# Patient Record
Sex: FEMALE | Race: BLACK OR AFRICAN AMERICAN | Employment: OTHER | URBAN - METROPOLITAN AREA
[De-identification: names, ages, dates, MRNs, and addresses within clinical notes are randomized per-mention and may not be internally consistent; named-entity substitution may affect disease eponyms.]

---

## 2017-04-11 LAB
ALBUMIN SERPL-MCNC: 3.9 G/DL
ALBUMIN SERPL-MCNC: 3.9 G/DL
ALP BLD-CCNC: 114 U/L
ALP BLD-CCNC: 114 U/L
ALT SERPL-CCNC: 15 U/L
ALT SERPL-CCNC: 15 U/L
AST SERPL-CCNC: 24 U/L
AST SERPL-CCNC: 24 U/L
BILIRUB SERPL-MCNC: 1 MG/DL (ref 0.1–1.4)
BILIRUB SERPL-MCNC: 1 MG/DL (ref 0.1–1.4)
BUN BLDV-MCNC: 16 MG/DL
BUN BLDV-MCNC: 16 MG/DL
CALCIUM SERPL-MCNC: 9.3 MG/DL
CALCIUM SERPL-MCNC: 9.3 MG/DL
CHLORIDE BLD-SCNC: 102 MMOL/L
CHLORIDE BLD-SCNC: 102 MMOL/L
CHOLESTEROL, TOTAL: 121 MG/DL
CHOLESTEROL, TOTAL: 121 MG/DL
CHOLESTEROL/HDL RATIO: NORMAL
CHOLESTEROL/HDL RATIO: NORMAL
CO2: 26 MMOL/L
CO2: 26 MMOL/L
CREAT SERPL-MCNC: 1 MG/DL
CREAT SERPL-MCNC: 1 MG/DL
GFR CALCULATED: 50
GFR CALCULATED: NORMAL
GLUCOSE BLD-MCNC: 128 MG/DL
GLUCOSE BLD-MCNC: 128 MG/DL
HBA1C MFR BLD: 6.7 %
HCT VFR BLD CALC: 38.9 % (ref 36–46)
HDLC SERPL-MCNC: 57 MG/DL (ref 35–70)
HDLC SERPL-MCNC: 57 MG/DL (ref 35–70)
HEMOGLOBIN: 12.4 G/DL (ref 12–16)
LDL CHOLESTEROL CALCULATED: 42 MG/DL (ref 0–160)
LDL CHOLESTEROL CALCULATED: 42 MG/DL (ref 0–160)
PLATELET # BLD: 193 K/ΜL
POTASSIUM SERPL-SCNC: 4.2 MMOL/L
POTASSIUM SERPL-SCNC: 4.2 MMOL/L
SODIUM BLD-SCNC: 142 MMOL/L
SODIUM BLD-SCNC: 142 MMOL/L
TOTAL PROTEIN: 7.3
TOTAL PROTEIN: 7.3
TRIGL SERPL-MCNC: 112 MG/DL
TRIGL SERPL-MCNC: 112 MG/DL
VLDLC SERPL CALC-MCNC: 22 MG/DL
VLDLC SERPL CALC-MCNC: NORMAL MG/DL
WBC # BLD: 6.2 10^3/ML

## 2017-04-17 ENCOUNTER — OFFICE VISIT (OUTPATIENT)
Dept: CARDIOLOGY CLINIC | Age: 82
End: 2017-04-17

## 2017-04-17 VITALS
RESPIRATION RATE: 16 BRPM | WEIGHT: 153 LBS | SYSTOLIC BLOOD PRESSURE: 130 MMHG | OXYGEN SATURATION: 95 % | BODY MASS INDEX: 30.04 KG/M2 | HEIGHT: 60 IN | HEART RATE: 64 BPM | DIASTOLIC BLOOD PRESSURE: 84 MMHG

## 2017-04-17 DIAGNOSIS — E11.9 TYPE 2 DIABETES MELLITUS WITHOUT COMPLICATION, WITHOUT LONG-TERM CURRENT USE OF INSULIN (HCC): ICD-10-CM

## 2017-04-17 DIAGNOSIS — I25.10 CORONARY ARTERY DISEASE INVOLVING NATIVE CORONARY ARTERY OF NATIVE HEART WITHOUT ANGINA PECTORIS: Primary | ICD-10-CM

## 2017-04-17 DIAGNOSIS — I10 ESSENTIAL HYPERTENSION: ICD-10-CM

## 2017-04-17 DIAGNOSIS — E78.5 HYPERLIPIDEMIA, UNSPECIFIED HYPERLIPIDEMIA TYPE: ICD-10-CM

## 2017-04-17 PROCEDURE — 99214 OFFICE O/P EST MOD 30 MIN: CPT | Performed by: INTERNAL MEDICINE

## 2017-11-06 ENCOUNTER — OFFICE VISIT (OUTPATIENT)
Dept: CARDIOLOGY CLINIC | Age: 82
End: 2017-11-06

## 2017-11-06 VITALS
BODY MASS INDEX: 26.8 KG/M2 | DIASTOLIC BLOOD PRESSURE: 78 MMHG | HEIGHT: 64 IN | WEIGHT: 157 LBS | SYSTOLIC BLOOD PRESSURE: 118 MMHG | HEART RATE: 84 BPM

## 2017-11-06 DIAGNOSIS — I25.10 CORONARY ARTERY DISEASE INVOLVING NATIVE CORONARY ARTERY OF NATIVE HEART WITHOUT ANGINA PECTORIS: Primary | ICD-10-CM

## 2017-11-06 DIAGNOSIS — I10 ESSENTIAL HYPERTENSION: ICD-10-CM

## 2017-11-06 DIAGNOSIS — E78.49 OTHER HYPERLIPIDEMIA: ICD-10-CM

## 2017-11-06 DIAGNOSIS — E11.9 TYPE 2 DIABETES MELLITUS WITHOUT COMPLICATION, WITHOUT LONG-TERM CURRENT USE OF INSULIN (HCC): ICD-10-CM

## 2017-11-06 PROCEDURE — 99214 OFFICE O/P EST MOD 30 MIN: CPT | Performed by: INTERNAL MEDICINE

## 2017-11-06 NOTE — PROGRESS NOTES
by mouth daily.  omeprazole (PRILOSEC) 20 MG capsule Take 20 mg by mouth daily.  aspirin 81 MG EC tablet Take 81 mg by mouth daily.  metoprolol (TOPROL XL) 25 MG XL tablet Take 25 mg by mouth daily.  amLODIPine (NORVASC) 5 MG tablet Take 5 mg by mouth daily. ALLERGIES    No Known Allergies    FAMILY HISTORY    Family History   Problem Relation Age of Onset    Other Mother 68     pneumonia    Cancer Father     Cancer Brother     Cancer Brother 79     2008    Heart Disease Brother     Heart Attack Brother        SURGICAL HISTORY    Past Surgical History:   Procedure Laterality Date    BREAST LUMPECTOMY  2/08    left    HYSTERECTOMY  1965    KNEE SURGERY  07/2013    left knee       PHYSICAL EXAM    Vital Signs:  /78   Pulse 84   Ht 5' 4\" (1.626 m)   Wt 157 lb (71.2 kg)   BMI 26.95 kg/m²   Constitutional:  Well developed, well nourished, no acute distress, non-toxic appearance. HENT:  Normocephalic, atraumatic, bilateral external ears normal, oropharynx moist, no oral exudates, Nose normal. Neck- normal range of motion, no tenderness, supple, no stridor. Eyes:  PERRL, EOMI, conjunctiva normal, no discharge. Respiratory:  Lungs are clear to auscultation  Cardiovascular:  Regular rhythm grade 2/6 systolic murmur left sternal border no double or gallop  GI:  Bowel sounds normal, Soft, no tenderness, no masses, no pulsatile masses. Integument:  Warm, dry, no erythema, no rash. Back:  No tenderness, no CVA tenderness. Musculoskeletal:  Intact distal pulses, no edema, no tenderness, no cyanosis, no clubbing. Good range of motion in all major joints. No tenderness to palpation or major deformities noted. Back- No tenderness. Neurologic:  Alert & oriented x 3, normal motor function, normal sensory function, no focal deficits noted. Psychiatric:  Affect normal, judgment normal, mood normal.     AssessmenT    1.  Coronary artery disease involving native coronary artery of native heart without angina pectoris    2. Type 2 diabetes mellitus without complication, without long-term current use of insulin ([facility] [address]    3. Essential hypertension    4.  Other hyperlipidemia        Plan  Continue current management  Office visit in 6 months or as needed    Electronically signed by: Abram Long, 11/6/2017 12:30 PM

## 2018-05-16 ENCOUNTER — OFFICE VISIT (OUTPATIENT)
Dept: CARDIOLOGY CLINIC | Age: 83
End: 2018-05-16

## 2018-05-16 VITALS
HEART RATE: 80 BPM | DIASTOLIC BLOOD PRESSURE: 82 MMHG | SYSTOLIC BLOOD PRESSURE: 116 MMHG | HEIGHT: 64 IN | WEIGHT: 154.6 LBS | BODY MASS INDEX: 26.4 KG/M2

## 2018-05-16 DIAGNOSIS — I25.10 CORONARY ARTERY DISEASE INVOLVING NATIVE CORONARY ARTERY OF NATIVE HEART WITHOUT ANGINA PECTORIS: Primary | ICD-10-CM

## 2018-05-16 DIAGNOSIS — I10 ESSENTIAL HYPERTENSION: ICD-10-CM

## 2018-05-16 DIAGNOSIS — E78.5 HYPERLIPIDEMIA, UNSPECIFIED HYPERLIPIDEMIA TYPE: ICD-10-CM

## 2018-05-16 PROBLEM — E11.9 TYPE 2 DIABETES MELLITUS WITHOUT COMPLICATION (HCC): Status: RESOLVED | Noted: 2017-04-17 | Resolved: 2018-05-16

## 2018-05-16 PROCEDURE — 4040F PNEUMOC VAC/ADMIN/RCVD: CPT | Performed by: INTERNAL MEDICINE

## 2018-05-16 PROCEDURE — 1036F TOBACCO NON-USER: CPT | Performed by: INTERNAL MEDICINE

## 2018-05-16 PROCEDURE — 1090F PRES/ABSN URINE INCON ASSESS: CPT | Performed by: INTERNAL MEDICINE

## 2018-05-16 PROCEDURE — G8427 DOCREV CUR MEDS BY ELIG CLIN: HCPCS | Performed by: INTERNAL MEDICINE

## 2018-05-16 PROCEDURE — G8419 CALC BMI OUT NRM PARAM NOF/U: HCPCS | Performed by: INTERNAL MEDICINE

## 2018-05-16 PROCEDURE — 99213 OFFICE O/P EST LOW 20 MIN: CPT | Performed by: INTERNAL MEDICINE

## 2018-05-16 PROCEDURE — G8598 ASA/ANTIPLAT THER USED: HCPCS | Performed by: INTERNAL MEDICINE

## 2018-05-16 PROCEDURE — 1123F ACP DISCUSS/DSCN MKR DOCD: CPT | Performed by: INTERNAL MEDICINE

## 2020-01-28 ENCOUNTER — APPOINTMENT (OUTPATIENT)
Dept: CT IMAGING | Age: 85
End: 2020-01-28
Payer: MEDICARE

## 2020-01-28 ENCOUNTER — APPOINTMENT (OUTPATIENT)
Dept: GENERAL RADIOLOGY | Age: 85
End: 2020-01-28
Payer: MEDICARE

## 2020-01-28 ENCOUNTER — HOSPITAL ENCOUNTER (OUTPATIENT)
Age: 85
Setting detail: OBSERVATION
Discharge: NURSING, REHAB OR LONG-TERM CARE | End: 2020-01-31
Attending: EMERGENCY MEDICINE | Admitting: HOSPITALIST
Payer: MEDICARE

## 2020-01-28 LAB
ABO/RH: NORMAL
ACANTHOCYTES: ABNORMAL
ALBUMIN SERPL-MCNC: 4.3 GM/DL (ref 3.4–5)
ALP BLD-CCNC: 106 IU/L (ref 40–129)
ALT SERPL-CCNC: 71 U/L (ref 10–40)
ANION GAP SERPL CALCULATED.3IONS-SCNC: 15 MMOL/L (ref 4–16)
ANTIBODY SCREEN: NEGATIVE
APTT: 28.4 SECONDS (ref 25.1–37.1)
AST SERPL-CCNC: 102 IU/L (ref 15–37)
BANDED NEUTROPHILS ABSOLUTE COUNT: 0.86 K/CU MM
BANDED NEUTROPHILS RELATIVE PERCENT: 7 % (ref 5–11)
BILIRUB SERPL-MCNC: 1.3 MG/DL (ref 0–1)
BUN BLDV-MCNC: 21 MG/DL (ref 6–23)
CALCIUM SERPL-MCNC: 9.4 MG/DL (ref 8.3–10.6)
CHLORIDE BLD-SCNC: 99 MMOL/L (ref 99–110)
CO2: 27 MMOL/L (ref 21–32)
CREAT SERPL-MCNC: 1.2 MG/DL (ref 0.6–1.1)
DIFFERENTIAL TYPE: ABNORMAL
DOHLE BODIES: PRESENT
GFR AFRICAN AMERICAN: 51 ML/MIN/1.73M2
GFR NON-AFRICAN AMERICAN: 42 ML/MIN/1.73M2
GLUCOSE BLD-MCNC: 182 MG/DL (ref 70–99)
HCT VFR BLD CALC: 41.6 % (ref 37–47)
HEMOGLOBIN: 13.1 GM/DL (ref 12.5–16)
HYPOCHROMIA: ABNORMAL
INR BLD: 1.08 INDEX
LYMPHOCYTES ABSOLUTE: 0.4 K/CU MM
LYMPHOCYTES RELATIVE PERCENT: 3 % (ref 24–44)
MCH RBC QN AUTO: 29.6 PG (ref 27–31)
MCHC RBC AUTO-ENTMCNC: 31.5 % (ref 32–36)
MCV RBC AUTO: 93.9 FL (ref 78–100)
MONOCYTES ABSOLUTE: 1 K/CU MM
MONOCYTES RELATIVE PERCENT: 8 % (ref 0–4)
PDW BLD-RTO: 12.5 % (ref 11.7–14.9)
PLATELET # BLD: 126 K/CU MM (ref 140–440)
PMV BLD AUTO: 12.1 FL (ref 7.5–11.1)
POTASSIUM SERPL-SCNC: 3.5 MMOL/L (ref 3.5–5.1)
PRO-BNP: 513 PG/ML
PROTHROMBIN TIME: 13.1 SECONDS (ref 11.7–14.5)
RBC # BLD: 4.43 M/CU MM (ref 4.2–5.4)
SEGMENTED NEUTROPHILS ABSOLUTE COUNT: 10 K/CU MM
SEGMENTED NEUTROPHILS RELATIVE PERCENT: 82 % (ref 36–66)
SODIUM BLD-SCNC: 141 MMOL/L (ref 135–145)
TOTAL CK: 681 IU/L (ref 26–140)
TOTAL PROTEIN: 7.6 GM/DL (ref 6.4–8.2)
TOXIC GRANULATION: PRESENT
TROPONIN T: 0.01 NG/ML
WBC # BLD: 12.3 K/CU MM (ref 4–10.5)
WBC # BLD: ABNORMAL 10*3/UL

## 2020-01-28 PROCEDURE — 71045 X-RAY EXAM CHEST 1 VIEW: CPT

## 2020-01-28 PROCEDURE — 72192 CT PELVIS W/O DYE: CPT

## 2020-01-28 PROCEDURE — 86901 BLOOD TYPING SEROLOGIC RH(D): CPT

## 2020-01-28 PROCEDURE — 85027 COMPLETE CBC AUTOMATED: CPT

## 2020-01-28 PROCEDURE — 82550 ASSAY OF CK (CPK): CPT

## 2020-01-28 PROCEDURE — 73501 X-RAY EXAM HIP UNI 1 VIEW: CPT

## 2020-01-28 PROCEDURE — 85610 PROTHROMBIN TIME: CPT

## 2020-01-28 PROCEDURE — 85007 BL SMEAR W/DIFF WBC COUNT: CPT

## 2020-01-28 PROCEDURE — 86850 RBC ANTIBODY SCREEN: CPT

## 2020-01-28 PROCEDURE — 85730 THROMBOPLASTIN TIME PARTIAL: CPT

## 2020-01-28 PROCEDURE — 73552 X-RAY EXAM OF FEMUR 2/>: CPT

## 2020-01-28 PROCEDURE — 86900 BLOOD TYPING SEROLOGIC ABO: CPT

## 2020-01-28 PROCEDURE — 80053 COMPREHEN METABOLIC PANEL: CPT

## 2020-01-28 PROCEDURE — 84484 ASSAY OF TROPONIN QUANT: CPT

## 2020-01-28 PROCEDURE — 93005 ELECTROCARDIOGRAM TRACING: CPT | Performed by: PHYSICIAN ASSISTANT

## 2020-01-28 PROCEDURE — 72125 CT NECK SPINE W/O DYE: CPT

## 2020-01-28 PROCEDURE — 99285 EMERGENCY DEPT VISIT HI MDM: CPT

## 2020-01-28 PROCEDURE — 83880 ASSAY OF NATRIURETIC PEPTIDE: CPT

## 2020-01-28 PROCEDURE — 70450 CT HEAD/BRAIN W/O DYE: CPT

## 2020-01-28 ASSESSMENT — PAIN DESCRIPTION - LOCATION: LOCATION: LEG

## 2020-01-28 ASSESSMENT — PAIN SCALES - GENERAL: PAINLEVEL_OUTOF10: 7

## 2020-01-28 ASSESSMENT — PAIN DESCRIPTION - ORIENTATION: ORIENTATION: RIGHT;LEFT

## 2020-01-29 ENCOUNTER — APPOINTMENT (OUTPATIENT)
Dept: ULTRASOUND IMAGING | Age: 85
End: 2020-01-29
Payer: MEDICARE

## 2020-01-29 PROBLEM — E43 SEVERE MALNUTRITION (HCC): Chronic | Status: ACTIVE | Noted: 2020-01-29
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
## 2020-01-29 LAB
AMMONIA: 24 UMOL/L (ref 11–51)
BACTERIA: ABNORMAL /HPF
BILIRUBIN URINE: NEGATIVE MG/DL
BLOOD, URINE: ABNORMAL
CLARITY: ABNORMAL
COLOR: YELLOW
FOLATE: 14.7 NG/ML (ref 3.1–17.5)
GLUCOSE, URINE: 50 MG/DL
KETONES, URINE: NEGATIVE MG/DL
LEUKOCYTE ESTERASE, URINE: ABNORMAL
MUCUS: ABNORMAL HPF
NITRITE URINE, QUANTITATIVE: NEGATIVE
PH, URINE: 6 (ref 5–8)
PROTEIN UA: 100 MG/DL
RBC URINE: 253 /HPF (ref 0–6)
SPECIFIC GRAVITY UA: 1.02 (ref 1–1.03)
SQUAMOUS EPITHELIAL: 2 /HPF
TRICHOMONAS: ABNORMAL /HPF
TROPONIN T: 0.01 NG/ML
TSH HIGH SENSITIVITY: 1.92 UIU/ML (ref 0.27–4.2)
UROBILINOGEN, URINE: NORMAL MG/DL (ref 0.2–1)
VITAMIN B-12: 454.7 PG/ML (ref 211–911)
VITAMIN D 25-HYDROXY: 16.51 NG/ML
WBC CLUMP: ABNORMAL /HPF
WBC UA: 90 /HPF (ref 0–5)

## 2020-01-29 PROCEDURE — 96372 THER/PROPH/DIAG INJ SC/IM: CPT

## 2020-01-29 PROCEDURE — 82140 ASSAY OF AMMONIA: CPT

## 2020-01-29 PROCEDURE — 6360000002 HC RX W HCPCS: Performed by: INTERNAL MEDICINE

## 2020-01-29 PROCEDURE — 93010 ELECTROCARDIOGRAM REPORT: CPT | Performed by: INTERNAL MEDICINE

## 2020-01-29 PROCEDURE — 97166 OT EVAL MOD COMPLEX 45 MIN: CPT

## 2020-01-29 PROCEDURE — 6360000002 HC RX W HCPCS: Performed by: PHYSICIAN ASSISTANT

## 2020-01-29 PROCEDURE — 81001 URINALYSIS AUTO W/SCOPE: CPT

## 2020-01-29 PROCEDURE — 96365 THER/PROPH/DIAG IV INF INIT: CPT

## 2020-01-29 PROCEDURE — 97162 PT EVAL MOD COMPLEX 30 MIN: CPT

## 2020-01-29 PROCEDURE — 84443 ASSAY THYROID STIM HORMONE: CPT

## 2020-01-29 PROCEDURE — 6370000000 HC RX 637 (ALT 250 FOR IP): Performed by: HOSPITALIST

## 2020-01-29 PROCEDURE — 82607 VITAMIN B-12: CPT

## 2020-01-29 PROCEDURE — 76705 ECHO EXAM OF ABDOMEN: CPT

## 2020-01-29 PROCEDURE — 97535 SELF CARE MNGMENT TRAINING: CPT

## 2020-01-29 PROCEDURE — 2580000003 HC RX 258

## 2020-01-29 PROCEDURE — 84484 ASSAY OF TROPONIN QUANT: CPT

## 2020-01-29 PROCEDURE — 97530 THERAPEUTIC ACTIVITIES: CPT

## 2020-01-29 PROCEDURE — G0378 HOSPITAL OBSERVATION PER HR: HCPCS

## 2020-01-29 PROCEDURE — 6360000002 HC RX W HCPCS: Performed by: HOSPITALIST

## 2020-01-29 PROCEDURE — 51701 INSERT BLADDER CATHETER: CPT

## 2020-01-29 PROCEDURE — 2580000003 HC RX 258: Performed by: HOSPITALIST

## 2020-01-29 PROCEDURE — 87086 URINE CULTURE/COLONY COUNT: CPT

## 2020-01-29 PROCEDURE — 82306 VITAMIN D 25 HYDROXY: CPT

## 2020-01-29 PROCEDURE — 2580000003 HC RX 258: Performed by: INTERNAL MEDICINE

## 2020-01-29 PROCEDURE — 96376 TX/PRO/DX INJ SAME DRUG ADON: CPT

## 2020-01-29 PROCEDURE — 82746 ASSAY OF FOLIC ACID SERUM: CPT

## 2020-01-29 RX ORDER — DONEPEZIL HYDROCHLORIDE 5 MG/1
5 TABLET, FILM COATED ORAL NIGHTLY
Status: DISCONTINUED | OUTPATIENT
Start: 2020-01-29 | End: 2020-01-31 | Stop reason: HOSPADM

## 2020-01-29 RX ORDER — SODIUM CHLORIDE 9 MG/ML
INJECTION, SOLUTION INTRAVENOUS
Status: COMPLETED
Start: 2020-01-29 | End: 2020-01-29

## 2020-01-29 RX ORDER — DONEPEZIL HYDROCHLORIDE 5 MG/1
5 TABLET, FILM COATED ORAL NIGHTLY
COMMUNITY

## 2020-01-29 RX ORDER — SODIUM CHLORIDE 0.9 % (FLUSH) 0.9 %
10 SYRINGE (ML) INJECTION PRN
Status: DISCONTINUED | OUTPATIENT
Start: 2020-01-29 | End: 2020-01-31 | Stop reason: HOSPADM

## 2020-01-29 RX ORDER — AMLODIPINE BESYLATE 5 MG/1
5 TABLET ORAL DAILY
Status: DISCONTINUED | OUTPATIENT
Start: 2020-01-29 | End: 2020-01-31 | Stop reason: HOSPADM

## 2020-01-29 RX ORDER — SODIUM CHLORIDE 9 MG/ML
INJECTION, SOLUTION INTRAVENOUS CONTINUOUS
Status: DISCONTINUED | OUTPATIENT
Start: 2020-01-29 | End: 2020-01-31 | Stop reason: HOSPADM

## 2020-01-29 RX ORDER — ATORVASTATIN CALCIUM 20 MG/1
20 TABLET, FILM COATED ORAL DAILY
Status: DISCONTINUED | OUTPATIENT
Start: 2020-01-29 | End: 2020-01-31 | Stop reason: HOSPADM

## 2020-01-29 RX ORDER — ERGOCALCIFEROL 1.25 MG/1
50000 CAPSULE ORAL WEEKLY
Status: DISCONTINUED | OUTPATIENT
Start: 2020-01-29 | End: 2020-01-31 | Stop reason: HOSPADM

## 2020-01-29 RX ORDER — PANTOPRAZOLE SODIUM 40 MG/1
40 TABLET, DELAYED RELEASE ORAL
Status: DISCONTINUED | OUTPATIENT
Start: 2020-01-29 | End: 2020-01-31 | Stop reason: HOSPADM

## 2020-01-29 RX ORDER — METOPROLOL SUCCINATE 25 MG/1
25 TABLET, EXTENDED RELEASE ORAL DAILY
Status: DISCONTINUED | OUTPATIENT
Start: 2020-01-29 | End: 2020-01-31 | Stop reason: HOSPADM

## 2020-01-29 RX ORDER — ASPIRIN 81 MG/1
81 TABLET ORAL DAILY
Status: DISCONTINUED | OUTPATIENT
Start: 2020-01-29 | End: 2020-01-31 | Stop reason: HOSPADM

## 2020-01-29 RX ORDER — ACETAMINOPHEN 325 MG/1
650 TABLET ORAL EVERY 4 HOURS PRN
Status: DISCONTINUED | OUTPATIENT
Start: 2020-01-29 | End: 2020-01-31 | Stop reason: HOSPADM

## 2020-01-29 RX ORDER — MORPHINE SULFATE 4 MG/ML
2 INJECTION, SOLUTION INTRAMUSCULAR; INTRAVENOUS ONCE
Status: COMPLETED | OUTPATIENT
Start: 2020-01-29 | End: 2020-01-29

## 2020-01-29 RX ORDER — SODIUM CHLORIDE 0.9 % (FLUSH) 0.9 %
10 SYRINGE (ML) INJECTION EVERY 12 HOURS SCHEDULED
Status: DISCONTINUED | OUTPATIENT
Start: 2020-01-29 | End: 2020-01-31 | Stop reason: HOSPADM

## 2020-01-29 RX ORDER — ONDANSETRON 2 MG/ML
4 INJECTION INTRAMUSCULAR; INTRAVENOUS EVERY 6 HOURS PRN
Status: DISCONTINUED | OUTPATIENT
Start: 2020-01-29 | End: 2020-01-29

## 2020-01-29 RX ADMIN — PANTOPRAZOLE SODIUM 40 MG: 40 TABLET, DELAYED RELEASE ORAL at 05:28

## 2020-01-29 RX ADMIN — MORPHINE SULFATE 2 MG: 4 INJECTION, SOLUTION INTRAMUSCULAR; INTRAVENOUS at 02:38

## 2020-01-29 RX ADMIN — ENOXAPARIN SODIUM 30 MG: 30 INJECTION SUBCUTANEOUS at 08:35

## 2020-01-29 RX ADMIN — METOPROLOL SUCCINATE 25 MG: 25 TABLET, EXTENDED RELEASE ORAL at 08:35

## 2020-01-29 RX ADMIN — AMLODIPINE BESYLATE 5 MG: 5 TABLET ORAL at 08:34

## 2020-01-29 RX ADMIN — CEFTRIAXONE 1 G: 1 INJECTION, POWDER, FOR SOLUTION INTRAMUSCULAR; INTRAVENOUS at 15:31

## 2020-01-29 RX ADMIN — ASPIRIN 81 MG: 81 TABLET, COATED ORAL at 08:35

## 2020-01-29 RX ADMIN — ATORVASTATIN CALCIUM 20 MG: 20 TABLET, FILM COATED ORAL at 08:35

## 2020-01-29 RX ADMIN — SODIUM CHLORIDE: 9 INJECTION, SOLUTION INTRAVENOUS at 15:32

## 2020-01-29 RX ADMIN — SODIUM CHLORIDE: 9 INJECTION, SOLUTION INTRAVENOUS at 02:48

## 2020-01-29 RX ADMIN — DONEPEZIL HYDROCHLORIDE 5 MG: 5 TABLET, FILM COATED ORAL at 22:10

## 2020-01-29 RX ADMIN — SODIUM CHLORIDE: 9 INJECTION, SOLUTION INTRAVENOUS at 02:47

## 2020-01-29 ASSESSMENT — PAIN DESCRIPTION - PAIN TYPE: TYPE: ACUTE PAIN

## 2020-01-29 ASSESSMENT — PAIN DESCRIPTION - LOCATION: LOCATION: GENERALIZED;LEG

## 2020-01-29 ASSESSMENT — PAIN SCALES - GENERAL
PAINLEVEL_OUTOF10: 8
PAINLEVEL_OUTOF10: 8

## 2020-01-29 NOTE — CONSULTS
[address], 8/23/1928, [id number], 1/29/2020    History  [place]:  The primary encounter diagnosis was Syncope and collapse. Diagnoses of Fall down stairs, initial encounter, Right leg pain, Elevated liver enzymes, Elevated CK, and Elevated troponin were also pertinent to this visit. Patient  has a past medical history of CAD (coronary artery disease), Chronic cough, Depression, Family history of coronary artery disease, Fibromyalgia, Glaucoma, H/O cardiovascular stress test, H/O exercise stress test, H/O transfusion of whole blood, History of Doppler ultrasound, Hx of echocardiogram, Hyperlipidemia, Hypertension, Insomnia, Menopause, MI (myocardial infarction) ([facility] [address] Mitral valve regurgitation, and Numbness and tingling. Patient  has a past surgical history that includes Breast lumpectomy (2/08); Hysterectomy (1965); and knee surgery (07/2013). Subjective:  Patient states:  Amenable to therapy. Pain:  Reports soreness in R hip, unable to rate. Communication with other providers:  Handoff to RN, co-eval with OT. Restrictions: Fall risk    Home Setup/Prior level of function  Patient lives at home alone, ambulates with cane at baseline per charting, patient is a poor historian. Examination of body systems (includes body structures/functions, activity/participation limitations):  · Observation:  Sitting up in chair upon arrival   · Vision:  Impaired, does not have glasses present  · Hearing:  [place]  · Cardiopulmonary:  No O2 needs  · Cognition: impaired, baseline dementia, see OT/SLP note for further evaluation. Musculoskeletal  · ROM R/L:  WFL. · Strength R/L:  4-/5, weakness in function and endurance. · Neuro:  [facility]      Mobility:  · Transfers: Mod A x2 with RW  · Sitting balance:  CGA. · Standing balance:   Mod A with RW.    · Gait: unable on eval    [facility] 6 Clicks Inpatient Mobility:  AM-PAC Inpatient Mobility Raw Score : 11    Treatment:  Transfers: when cued to stand patient unable to initiate. Verbal and tactile cues with mod A to scoot hips to edge of seat. Cues for hand placement and use of AD, mod A x2 to stand, patient retropulsive. Able to tolerated standing balance x1 min, forward flexed posture, assisted with weight shifts side to side. Scooted back in chair for optimal positioning. Significantly increased time to complete all tasks, patient requires extra time d/t dementia, requires max cueing and hand over hand to initiate tasks. Safety: patient left in chair with OT for oral care, call light within reach, RN notified, gait belt used. Assessment:  Patient is a 79 yo female who present after fall on stairs at home, was found down at bottom of stairs after she didn't answer he daughters phone call. Patient demonstrates impaired cognition with underlying dementia and impaired mobility at time of evaluation requires mod to max assist for mobility. Patient would benefit from skilled PT services and d/c to SNF. Complexity: Moderate  Prognosis: Good, no significant barriers to participation at this time. Plan Times per week: 3/week, 1 week,   Discharge Recommendations: Subacute/Skilled Nursing Facility  Equipment: TBD, likely RW. Goals:  Short term goals  Time Frame for Short term goals: 1 week  Short term goal 1: Patient will perform supine to sit with min A. Short term goal 2: Patient will perform STS with min A with RW  Short term goal 3: Patient will ambulate x10ft with min A and RW. Treatment plan:  Bed mobility, transfers, balance, gait, TA, TX. Recommendations for NURSING mobility: stand pivot transfer, 2 person assist, with gait belt.      Time:   Time in: 1044  Time out: 1102  Timed treatment minutes: 10  Total time: 18    Electronically signed by:    Jose Wright, PT  1/29/2020, 1:43 PM

## 2020-01-29 NOTE — ED NOTES
Report called to [facility], RN.      Yohannes Villagran RN  01/29/20 [id number]

## 2020-01-29 NOTE — ED PROVIDER NOTES
Emergency [address] EMERGENCY DEPARTMENT    Patient: Lacey Ramirez  MRN: [id number]  : 1928  Date of Evaluation: 2020  ED Supervising Physician: Lili Louis MD    I independently examined and evaluated Lacey Ramirez. In brief, Lacey Ramirez is a 80 y.o. female that presents to the emergency department after she was found at the bottom of basement stairs and does not remember what happened. Unknown if she fell or lost consciousness. Patient complaining of hip pain at this time. Focused exam: Well-appearing patient in no acute distress. Cardiac exam reveals normal heart rate and rhythm without any murmurs. Lungs sounds are clear bilaterally with no increased work of breathing. Abdomen is soft, nontender, nondistended. Brief ED course/MDM: Patient presents as above, possible syncope, possible fall. No obvious signs of trauma on exam.  Vital signs are acceptable. Imaging does not show any evidence of acute injury. She does have a slight transaminitis. Patient started on IV fluids and will be admitted for further management, evaluation. Sinus rhythm with first-degree AV block. Left anterior fascicular block. Right bundle branch block. No specific ST or T wave changes. No pathologic Q waves. QTc is prolonged. All diagnostic, treatment, and disposition decisions were made by myself in conjunction with the SUSAN. For all further details of the patient's emergency department visit, please see their documentation.     (Please note that portions of this note may have been completed with a voice recognition program. Efforts were made to edit the dictations but occasionally words are mis-transcribed.)    MD Kristine Abel MD  [phone]

## 2020-01-29 NOTE — CARE COORDINATION
Pt's daughter has picked [facility] for rehab. Called/faxed referral to [facility] at [phone]. Back up plan is Mother Gely Salinas [phone]. CM will await Janie's callback.

## 2020-01-29 NOTE — ED PROVIDER NOTES
scan;EF=52%    H/O transfusion of whole blood     History of Doppler ultrasound 03/04/2009    normal peripheral study    Hx of echocardiogram 07/13    EF55%, mild mitral regurg, mild tricuspid regurg    Hyperlipidemia     Hypertension     Insomnia     Menopause     MI (myocardial infarction) ([facility] [address] 1981    Mitral valve regurgitation     Numbness and tingling      Past Surgical History:   Procedure Laterality Date    BREAST LUMPECTOMY  2/08    left    HYSTERECTOMY  1965    KNEE SURGERY  07/2013    left knee       CURRENT MEDICATIONS    Current Outpatient Rx   Medication Sig Dispense Refill    atorvastatin (LIPITOR) 20 MG tablet Take 20 mg by mouth daily.  omeprazole (PRILOSEC) 20 MG capsule Take 20 mg by mouth daily.  aspirin 81 MG EC tablet Take 81 mg by mouth daily.  metoprolol (TOPROL XL) 25 MG XL tablet Take 25 mg by mouth daily.  amLODIPine (NORVASC) 5 MG tablet Take 5 mg by mouth daily. ALLERGIES    No Known Allergies    SOCIAL AND FAMILY HISTORY    Social History     Socioeconomic History    Marital status:       Spouse name: None    Number of children: None    Years of education: None    Highest education level: None   Occupational History    None   Social Needs    Financial resource strain: None    Food insecurity:     Worry: None     Inability: None    Transportation needs:     Medical: None     Non-medical: None   Tobacco Use    Smoking status: Never Smoker    Smokeless tobacco: Never Used   Substance and Sexual Activity    Alcohol use: No    Drug use: No    Sexual activity: Never     Partners: Male     Comment:     Lifestyle    Physical activity:     Days per week: None     Minutes per session: None    Stress: None   Relationships    Social connections:     Talks on phone: None     Gets together: None     Attends Mosque service: None     Active member of club or organization: None     Attends meetings of clubs or organizations: None     Relationship status: None    Intimate partner violence:     Fear of current or ex partner: None     Emotionally abused: None     Physically abused: None     Forced sexual activity: None   Other Topics Concern    None   Social History Narrative    Wear corrective lenses     Family History   Problem Relation Age of Onset    Other Mother 68        pneumonia    Cancer Father     Cancer Brother     Cancer Brother 70        2008    Heart Disease Brother     Heart Attack Brother          PHYSICAL EXAM    VITAL SIGNS: BP (!) 188/107   Pulse 74   Temp 97.9 °F (36.6 °C) (Oral)   Resp 15   Ht 5' 4\" (1.626 m)   Wt 155 lb (70.3 kg)   SpO2 95%   Breastfeeding No   BMI 26.61 kg/m²    Constitutional:  Well developed, Well nourished  HENT:  Normocephalic, Atraumatic, PERRL. EOMI. Sclera clear. Conjunctiva normal, No discharge. Neck/Lymphatics: supple, no JVD, no swollen nodes  No reproducible cervical or paracervical tenderness to palpation  Cardiovascular:  Normal heart rate, Normal rhythm, No murmurs  Respiratory:  Nonlabored breathing. Normal breath sounds, No wheezing  Abdomen: Bowel sounds normal, Soft, No tenderness, no masses. benign  Musculoskeletal: No edema, No cyanosis  Neurovascularly intact all 4 extremities  Patient does have reproducible tenderness to the inguinal aspect of the right hip and decreased range of motion. Affected leg held in shortened and mild internal rotation posture  Integument:  Warm, Dry  Neurologic:  Alert & oriented , No focal deficits noted. Cranial nerves II through XII grossly intact. Finger to nose intact, rapid alternating movements intact. Sensation intact.   Lower extremity exam limited due to patient pain  Psychiatric:  Affect normal, Mood normal.       Labs:     Urinalysis (Final result)   Component (Lab Inquiry)   Collection Time Result Time COLOR U CLARITY U Glucose, Urine BILI UR KETONES U SPEC GRAV BLOOD U pH, Urine Protein, UA result                 PTT (Final result)   Component (Lab Inquiry)   Collection Time Result Time aPTT   01/28/20 21:15:00 01/28/20 21:43:56 28.4  EFFECTIVE 07/07/2017   . .. Final result                 TYPE AND SCREEN (Final result)   Component (Lab Inquiry)   Collection Time Result Time ABO/Rh Antibody Screen   01/28/20 21:15:00 01/28/20 21:58:51 O NEGATIVE NEGATIVE          Final result                 CK (Final result)   Component (Lab Inquiry)   Collection Time Result Time CK, TOTAL   01/28/20 21:15:00 01/28/20 21:45:17 681          Final result                 Troponin (Final result)   Component (Lab Inquiry)   Collection Time Result Time TROP T   01/28/20 21:15:00 01/28/20 21:48:34 0.010  (NOTE)   PATIENTS WITH . [facility] Final result         EKG    See attending note    RADIOLOGY    Xr Femur Right (min 2 Views)    Result Date: 1/29/2020  EXAMINATION: 4 XRAY VIEWS OF THE RIGHT FEMUR; ONE XRAY VIEW OF THE PELVIS AND TWO XRAY VIEWS RIGHT HIP 1/28/2020 9:05 pm COMPARISON: None HISTORY: ORDERING SYSTEM PROVIDED HISTORY: Injury TECHNOLOGIST PROVIDED HISTORY: Reason for exam:-> Injury Reason for Exam: Pain Acuity: Acute Type of Exam: Initial Mechanism of Injury: Fall Relevant Medical/Surgical History: None FINDINGS: Severe diffuse osteopenia limits evaluation for nondisplaced fractures. No gross evidence of acute displaced fractures. The pubic symphysis appears aligned. The sacroiliac joints appears reasonably symmetric. No gross evidence of acute displaced fracture within limitation of severe diffuse osteopenia. RECOMMENDATION: If there is persistent pain, consider follow-up CT or MR evaluation given degree of osteopenia.      Ct Head Wo Contrast    Result Date: 1/28/2020  EXAMINATION: CT OF THE HEAD WITHOUT CONTRAST  1/28/2020 9:44 pm TECHNIQUE: CT of the head was performed without the administration of intravenous contrast. Dose modulation, iterative reconstruction, and/or weight based adjustment to reduce the radiation dose to as low as reasonably achievable. COMPARISON: None. HISTORY: ORDERING SYSTEM PROVIDED HISTORY: injury TECHNOLOGIST PROVIDED HISTORY: Reason for exam:->injury Reason for Exam: Patient found by family face down at the bottom of her basement stairs, approximately 8 steps. Patient does not remember falling but does complain of leg pain. Acuity: Acute Type of Exam: Initial Mechanism of Injury: fall Relevant Medical/Surgical History: hx dm, htn FINDINGS: BONES/ALIGNMENT: There is no acute fracture or traumatic malalignment. DEGENERATIVE CHANGES: Mild-to-moderate multilevel degenerative changes. There is a posterior disc osteophyte complex at C4-5 with narrowing of the spinal canal to 7 mm at this level. SOFT TISSUES: There is no prevertebral soft tissue swelling. A 1.4 cm right thyroid nodule is of little clinical significance in a patient of this age. No further follow-up is indicated. 1. No acute fracture or malalignment of the cervical spine. 2. Posterior disc osteophyte complex at C4-5 with associated spinal canal narrowing to 7 mm. Ct Pelvis Wo Contrast Additional Contrast? None    Result Date: 1/28/2020  EXAMINATION: CT OF THE PELVIS WITHOUT CONTRAST 1/28/2020 9:53 pm TECHNIQUE: CT of the pelvis was performed without the administration of intravenous contrast.  Multiplanar reformatted images are provided for review. Dose modulation, iterative reconstruction, and/or weight based adjustment of the mA/kV was utilized to reduce the radiation dose to as low as reasonably achievable. COMPARISON: Pelvis and right hip radiograph 01/28/2020 HISTORY: ORDERING SYSTEM PROVIDED HISTORY: right hip pain TECHNOLOGIST PROVIDED HISTORY: Reason for exam:->right hip pain Additional Contrast?->None Reason for Exam: Patient found by family face down at the bottom of her basement stairs, approximately 8 steps. Patient does not remember falling but does complain of leg pain.  Acuity: Acute Type of Exam: Initial Mechanism of Injury: fall Relevant Medical/Surgical History: hx htn, cad, FINDINGS: Lack of intravenous contrast administration, partially limiting evaluation of the soft tissues and vasculature. GI/BOWEL: Normal course and caliber of the included small bowel and colon and of the rectum without obstruction. Partially fecalized contents of some small bowel loops, suggesting a degree of stasis. No definite visualization of the appendix if present. Mild rectal stool and minimal included colonic stool. PELVIS:  Surgically absent uterus. No definite visualization of the ovaries if present. Normal appearance of the urinary bladder. Suspected laxity of the pelvic floor musculature. PERITONEUM/RETROPERITONEUM:  Mild systemic atherosclerotic calcifications. No pelvic lymphadenopathy. No free intraperitoneal fluid nor gas. SOFT TISSUES/BONES:  Tiny fat containing left inguinal hernia. No inguinal lymphadenopathy. Diffuse osseous demineralization. No acute fracture. Joints maintain anatomic alignment. Mild degenerative disc disease and moderate to severe facet arthropathy in the included lumbar spine. Moderate to severe degenerative changes of the sacroiliac joints. Mild degenerative changes of the hips. Severe degenerative changes of the pubic symphysis. 1. No acute findings in the pelvis. 2. Degenerative changes as above. 3. Bony demineralization. Xr Chest Portable    Result Date: 1/28/2020  EXAMINATION: ONE XRAY VIEW OF THE CHEST 1/28/2020 9:05 pm COMPARISON: None. HISTORY: ORDERING SYSTEM PROVIDED HISTORY: fall TECHNOLOGIST PROVIDED HISTORY: Reason for exam:->fall Reason for Exam: pain, fall Acuity: Acute Type of Exam: Initial Mechanism of Injury: fall Relevant Medical/Surgical History: MI, CAD FINDINGS: The cardiac silhouette is moderately enlarged. The descending thoracic aorta is tortuous. Mild right basilar opacities. No pneumothorax, vascular congestion, or pleural effusion. 1. Mild right basilar opacities compatible with atelectasis. In the appropriate clinical setting pneumonia is not excluded. 2. Moderately enlarged cardiac silhouette. Us Abdomen Limited Specify Organ? Liver, Gallbladder, Pancreas    Result Date: 1/29/2020  EXAMINATION: RIGHT UPPER QUADRANT ULTRASOUND 1/29/2020 5:59 am COMPARISON: None. HISTORY: Elevated LFTs. FINDINGS: Per the sonographer, evaluation was suboptimal secondary to patient positioning. LIVER: The liver demonstrates abnormal echogenicity with a subtle nodular contour. No discrete lesion seen. No intrahepatic biliary ductal dilatation. BILIARY SYSTEM:  Gallbladder is unremarkable. Negative sonographic Morillo's sign. The extrahepatic bile duct is within normal limits measuring 3 mm. RIGHT KIDNEY: Unremarkable. PANCREAS:  Visualized portions are unremarkable. OTHER: No ascites. Possible cirrhosis. Otherwise unremarkable ultrasound. Xr Hip 1 Vw W Pelvis Right    Result Date: 1/29/2020  EXAMINATION: 4 XRAY VIEWS OF THE RIGHT FEMUR; ONE XRAY VIEW OF THE PELVIS AND TWO XRAY VIEWS RIGHT HIP 1/28/2020 9:05 pm COMPARISON: None HISTORY: ORDERING SYSTEM PROVIDED HISTORY: Injury TECHNOLOGIST PROVIDED HISTORY: Reason for exam:-> Injury Reason for Exam: Pain Acuity: Acute Type of Exam: Initial Mechanism of Injury: Fall Relevant Medical/Surgical History: None FINDINGS: Severe diffuse osteopenia limits evaluation for nondisplaced fractures. No gross evidence of acute displaced fractures. The pubic symphysis appears aligned. The sacroiliac joints appears reasonably symmetric. No gross evidence of acute displaced fracture within limitation of severe diffuse osteopenia. RECOMMENDATION: If there is persistent pain, consider follow-up CT or MR evaluation given degree of osteopenia. ED COURSE & MEDICAL DECISION MAKING       Vital signs and nursing notes reviewed during ED course.   Patient care and presentation staffed with supervising MD. Patient seen by supervising MD today. All pertinent Lab data and radiographic results reviewed with patient at bedside. The patient and/or the family were informed of the results of any tests/labs/imaging, the treatment plan, and time was allotted to answer questions. Clinical  IMPRESSION    1. Syncope and collapse    2. Fall down stairs, initial encounter    3. Right leg pain    4. Elevated liver enzymes    5. Elevated CK    6. Elevated troponin      Patient presents as above. Found at the bottom of the stairs by a neighbor. Daughter had neighbor go check on the patient. Daughter then presented to the ED. Patient cannot recall what led her to fall down the stairs and does not remember circumstances or remember the fall. For this reason labs and other testing obtained. Imaging is negative for any acute injuries. Patient does have mild elevation in CK, mild elevation in troponin and liver enzymes. She did not have any abdominal pain on exam.  She will be admitted for syncope work-up. She will also need to be evaluated for possible placement as she does live home alone. Daughter is in agreement with this plan. Patient and daughter updated on the results plan and are in agreement. I did speak to the hospitalist service who graciously agreed to admit. Comment: Please note this report has been produced using speech recognition software and may contain errors related to that system including errors in grammar, punctuation, and spelling, as well as words and phrases that may be inappropriate. If there are any questions or concerns please feel free to contact the dictating provider for clarification.         Stanley Villanueva PA-C  01/30/20 [id number]

## 2020-01-29 NOTE — ED TRIAGE NOTES
Patient found by family face down at the bottom of her basement stairs, approximately 8 steps. Patient does not remember falling but does complain of leg pain.

## 2020-01-29 NOTE — PROGRESS NOTES
Occupational Therapy    [facility] ACUTE CARE OCCUPATIONAL THERAPY EVALUATION  Fatemeh Patel, 8/23/1928, 4013/4013-A, 1/29/2020    History  [place]:  The primary encounter diagnosis was Syncope and collapse. Diagnoses of Fall down stairs, initial encounter, Right leg pain, Elevated liver enzymes, Elevated CK, and Elevated troponin were also pertinent to this visit. Patient  has a past medical history of CAD (coronary artery disease), Chronic cough, Depression, Family history of coronary artery disease, Fibromyalgia, Glaucoma, H/O cardiovascular stress test, H/O exercise stress test, H/O transfusion of whole blood, History of Doppler ultrasound, Hx of echocardiogram, Hyperlipidemia, Hypertension, Insomnia, Menopause, MI (myocardial infarction) ([address] Mitral valve regurgitation, and Numbness and tingling. Patient  has a past surgical history that includes Breast lumpectomy (2/08); Hysterectomy (1965); and knee surgery (07/2013). Subjective:  Patient states:  \"What do you want me to do now? \". Pain:  No  Communication with other providers:  Handoff to RN, co-eval with PT. Restrictions: General Precautions, Fall Risk    Home Setup/Prior level of function    Pt at baseline lives alone and peforms ADLs, high level IADLs, functional mobility/transfers Independently w/ AD. Sheng Feldman Examination of body systems (includes body structures/functions, activity/participation limitations):  · Observation:  Sitting upright in reclining chair, agreeable to therapy  · Vision:  Difficulty seeing objects a few inches in front of face.  Uses glasses at baseline but does not have them with here  · Hearing:  [place]  · Cardiopulmonary:  No 02 needs      Body Systems and functions:  · ROM R/L:  ~55 degrees shoulder flexion, distal WFL    · Strength R/L:  4-/5,   · Sensation: Neuropathy (as reported by daughter) in bilateral feet/hands  · Tone: Normal  · Coordination: Decreased gross/fine motor coordination, slower movements and presents w/ deficits in ADL and high level IADL independence, functional activity tolerance, dynamic sitting and standing balance and tolerance and functional transfers, BUE strength and coordination and perception and cognition.  Pt would benefit from continued acute care OT services w/ discharge to SNF  Complexity: Moderate  Prognosis: Good/Fair decreased cognition  Plan  Times per week: 2x+  Times per day: Daily  Current Treatment Recommendations: Strengthening, Endurance Training, Patient/Caregiver Education & Training, ROM, Cognitive Reorientation, Equipment Evaluation, Education, & procurement, Positioning, Balance Training, Pain Management, Self-Care / ADL, Functional Mobility Training, Safety Education & Training, Home Management Training, Cognitive/Perceptual Training     Equipment: defer    Goals:  Pt goal: go home  Time Frame for STGs: discharge  Goal 1: Pt will perform UE ADLs  SBA  Goal 2: Pt will perform LE ADLs CGA  Goal 3: Pt will perform toileting CGA  Goal 4: Pt will perform functional transfer w/ AD CGA  Goal 5: Pt will perform functional mobility w/ AD CGA  Goal 6: Pt will perform therex/theract in order to increase functional activity tolerance and dynamic standing balance    Treatment plan:  Pt will perform functional task in stand reaching in all 3 planes in order to increase dynamic standing balance and functional activity tolerance    Recommendations for NURSING activity: Up to chair for all 3 meals and up to [facility] for all toileting needs    Time:   Time in: 1043  Time out: 1107  Timed treatment minutes: 9 minutes  Total time: 24 minutes    Electronically signed by:    Amauri ROJAS/L [id number]  12:15 PM,1/29/2020

## 2020-01-29 NOTE — PLAN OF CARE
Nutrition Problem: Severe malnutrition, In context of chronic illness  Intervention: Food and/or Nutrient Delivery: Continue current diet  Nutritional Goals: pt will consume greater than 75% of her meals

## 2020-01-29 NOTE — H&P
nondistended, no masses or organomegaly. Genitourinary:  No CVA tenderness. Musculoskletal:  no clubbing or cyanosis. No joint swelling, warmth, or tenderness. Skin:  normal coloration and turgor, no rashes, no suspicious skin lesions noted. Neurologic: Normal speech, no focal findings or movement disorder noted. Hematologic/Lymphatic: No cervical lymphadenopathy. Data  (4-points for high level)  Laboratory this visit:  Reviewed    Radiology this visit:  Personally reviewed the following imaging films, and agree with the radiologist readings. Xr Femur Right (min 2 Views)    Result Date: 1/28/2020  EXAMINATION: 4 XRAY VIEWS OF THE RIGHT FEMUR; ONE XRAY VIEW OF THE PELVIS AND TWO XRAY VIEWS RIGHT HIP 1/28/2020 9:05 pm COMPARISON: None HISTORY: ORDERING SYSTEM PROVIDED HISTORY: Injury TECHNOLOGIST PROVIDED HISTORY: Reason for exam:-> Injury Reason for Exam: Pain Acuity: Acute Type of Exam: Initial Mechanism of Injury: Fall Relevant Medical/Surgical History: None FINDINGS: Severe diffuse osteopenia limits evaluation for nondisplaced fractures. No gross evidence of acute displaced fractures. The pubic symphysis appears aligned. The sacroiliac joints appears reasonably symmetric. No gross evidence of acute displaced fracture within limitation of severe diffuse osteopenia. If there is persistent pain, consider follow-up CT or MR evaluation given degree of osteopenia. Ct Cervical Spine Wo Contrast    Result Date: 1/28/2020  EXAMINATION: CT OF THE CERVICAL SPINE WITHOUT CONTRAST 1/28/2020 9:49 pm TECHNIQUE: CT of the cervical spine was performed without the administration of intravenous contrast. Multiplanar reformatted images are provided for review. Dose modulation, iterative reconstruction, and/or weight based adjustment of the mA/kV was utilized to reduce the radiation dose to as low as reasonably achievable. COMPARISON: None.  HISTORY: ORDERING SYSTEM PROVIDED HISTORY: injury TECHNOLOGIST exam:-> Injury Reason for Exam: Pain Acuity: Acute Type of Exam: Initial Mechanism of Injury: Fall Relevant Medical/Surgical History: None FINDINGS: Severe diffuse osteopenia limits evaluation for nondisplaced fractures. No gross evidence of acute displaced fractures. The pubic symphysis appears aligned. The sacroiliac joints appears reasonably symmetric. No gross evidence of acute displaced fracture within limitation of severe diffuse osteopenia. If there is persistent pain, consider follow-up CT or MR evaluation given degree of osteopenia. EKG this visit:  Personally reviewed EKG and telemetry strip. Documents:  Recent previous records, labs, imaging in the EMR were reviewed. Assessments and plans:  · Possible unwitnessed fall from stairs. History is almost unobtainable with her severe dementia. .  Work up in ED was negative for fracture or organ damages. Will observe clinically, and may require second work ups if she continues to have pain in extremities or worsening mental status. · Progressive dementia. Will obtain PT/OT for possible need for placement. Continue Aricept. · Right leg pain, without objective inflammation on exam, possible spasm? Will give tylenol and ultram prn for pain. May require pelvis CT to rule out hip fracture if she continues to have pain. · CAD, stable. Continue outpt aspirin, metoprolol. · GERD on PPI  · CKD stage 3, unchanged. Avoid nephrotoxins. · Leukocytosis. Check urine to look for UTI. · Elevated Transaminase. She has no abdominal symptoms or tenderness suggesting cholecystitis. Check US in am.     DVT prophylaxis - lovenox. Code status - has not been dicussed in details. Full code at this time. Discussed with daughter at bedside. The above assessments and plans were explained to the patient and family in lay language, who indicated understanding.       PHYSICIAN CERTIFICATION  I certify that Alan Brice is expected to be

## 2020-01-29 NOTE — PROGRESS NOTES
Nutrition Assessment    Type and Reason for Visit: Initial, Positive Nutrition Screen    Nutrition Recommendations:   · Continue current diet    Nutrition Assessment: Pt fell out due to a positive screen for weight loss. There are no weight history in the chart. Pt is showing severe muscle and sub q fat loss though this may be sarcopenia of age. Pt ate all of her lunch and adamantly states that she does not want supplements. Pt is at moderate risk at this time. Malnutrition Assessment:  · Malnutrition Status: Meets the criteria for severe malnutrition  · Context: Chronic illness  · Findings of the 6 clinical characteristics of malnutrition (Minimum of 2 out of 6 clinical characteristics is required to make the diagnosis of moderate or severe Protein Calorie Malnutrition based on AND/ASPEN Guidelines):  1. Energy Intake-Unable to assess, Unable to assess    2. Weight Loss-Unable to assess, unable to assess  3. Fat Loss-Severe subcutaneous fat loss, Orbital  4. Muscle Loss-Severe muscle mass loss, Clavicles (pectoralis and deltoids)  5. Fluid Accumulation-No significant fluid accumulation, Extremities  6.   Strength-Not measured    Nutrition Risk Level: High    Nutrient Needs:  · Estimated Daily Total Kcal: 931-1117 based on MSJ  · Estimated Daily Protein (g): 54-65 based on 1-1.2 g/kg/IBW  · Estimated Daily Total Fluid (ml/day): 931-1117 based on 1 mL/kcal    Nutrition Diagnosis:   · Problem: Severe malnutrition, In context of chronic illness  · Etiology: related to (advanced age)     Signs and symptoms:  as evidenced by Severe loss of subcutaneous fat, Severe muscle loss    Objective Information:  · Wound Type: None  · Current Nutrition Therapies:  · Oral Diet Orders: Cardiac   · Oral Diet intake: %  · Oral Nutrition Supplement (ONS) Orders: None  · Anthropometric Measures:  · Ht: 5' (152.4 cm)   · Current Body Wt: 131 lb (59.4 kg)  · Admission Body Wt: 131 lb (59.4 kg)  · Usual Body Wt: (CARMEN)  · %

## 2020-01-29 NOTE — PROGRESS NOTES
RENAL DOSE ADJUSTMENT MADE PER P/T PROTOCOL    PREVIOUS ORDER:  Enoxaparin 40 mg daily    Estimated Creatinine Clearance: 25 mL/min (A) (based on SCr of 1.2 mg/dL (H)).   Recent Labs     01/28/20 2115   BUN 21   CREATININE 1.2*   *   INR 1.08     NEW RENALLY ADJUSTED ORDER:  Enoxaparin 30 mg daily    Blakeyvette Espinoza, [facility]  1/29/2020 2:25 AM  __________________________________________________

## 2020-01-30 ENCOUNTER — APPOINTMENT (OUTPATIENT)
Dept: GENERAL RADIOLOGY | Age: 85
End: 2020-01-30
Payer: MEDICARE

## 2020-01-30 LAB
ALBUMIN SERPL-MCNC: 3.4 GM/DL (ref 3.4–5)
ALP BLD-CCNC: 67 IU/L (ref 40–128)
ALT SERPL-CCNC: 41 U/L (ref 10–40)
ANION GAP SERPL CALCULATED.3IONS-SCNC: 12 MMOL/L (ref 4–16)
AST SERPL-CCNC: 57 IU/L (ref 15–37)
BILIRUB SERPL-MCNC: 1.2 MG/DL (ref 0–1)
BUN BLDV-MCNC: 25 MG/DL (ref 6–23)
CALCIUM SERPL-MCNC: 8.1 MG/DL (ref 8.3–10.6)
CHLORIDE BLD-SCNC: 103 MMOL/L (ref 99–110)
CO2: 26 MMOL/L (ref 21–32)
CREAT SERPL-MCNC: 1.3 MG/DL (ref 0.6–1.1)
CULTURE: ABNORMAL
GFR AFRICAN AMERICAN: 46 ML/MIN/1.73M2
GFR NON-AFRICAN AMERICAN: 38 ML/MIN/1.73M2
GLUCOSE BLD-MCNC: 109 MG/DL (ref 70–99)
HCT VFR BLD CALC: 31.6 % (ref 37–47)
HEMOGLOBIN: ABNORMAL GM/DL (ref 12.5–16)
Lab: ABNORMAL
MCH RBC QN AUTO: 29.2 PG (ref 27–31)
MCHC RBC AUTO-ENTMCNC: 30.4 % (ref 32–36)
MCV RBC AUTO: 96 FL (ref 78–100)
PDW BLD-RTO: 12.9 % (ref 11.7–14.9)
PLATELET # BLD: 115 K/CU MM (ref 140–440)
PMV BLD AUTO: 11.4 FL (ref 7.5–11.1)
POTASSIUM SERPL-SCNC: ABNORMAL MMOL/L (ref 3.5–5.1)
RBC # BLD: 3.29 M/CU MM (ref 4.2–5.4)
SODIUM BLD-SCNC: 141 MMOL/L (ref 135–145)
SPECIMEN: ABNORMAL
TOTAL COLONY COUNT: ABNORMAL
TOTAL PROTEIN: 5.5 GM/DL (ref 6.4–8.2)
WBC # BLD: 8.5 K/CU MM (ref 4–10.5)

## 2020-01-30 PROCEDURE — 2580000003 HC RX 258: Performed by: INTERNAL MEDICINE

## 2020-01-30 PROCEDURE — 72100 X-RAY EXAM L-S SPINE 2/3 VWS: CPT

## 2020-01-30 PROCEDURE — G0378 HOSPITAL OBSERVATION PER HR: HCPCS

## 2020-01-30 PROCEDURE — 72072 X-RAY EXAM THORAC SPINE 3VWS: CPT

## 2020-01-30 PROCEDURE — 85027 COMPLETE CBC AUTOMATED: CPT

## 2020-01-30 PROCEDURE — 36415 COLL VENOUS BLD VENIPUNCTURE: CPT

## 2020-01-30 PROCEDURE — 6370000000 HC RX 637 (ALT 250 FOR IP): Performed by: HOSPITALIST

## 2020-01-30 PROCEDURE — 6370000000 HC RX 637 (ALT 250 FOR IP): Performed by: INTERNAL MEDICINE

## 2020-01-30 PROCEDURE — 6360000002 HC RX W HCPCS: Performed by: INTERNAL MEDICINE

## 2020-01-30 PROCEDURE — 2580000003 HC RX 258: Performed by: HOSPITALIST

## 2020-01-30 PROCEDURE — 80053 COMPREHEN METABOLIC PANEL: CPT

## 2020-01-30 PROCEDURE — 96372 THER/PROPH/DIAG INJ SC/IM: CPT

## 2020-01-30 PROCEDURE — 96366 THER/PROPH/DIAG IV INF ADDON: CPT

## 2020-01-30 PROCEDURE — 6360000002 HC RX W HCPCS: Performed by: HOSPITALIST

## 2020-01-30 RX ORDER — OXYCODONE HYDROCHLORIDE AND ACETAMINOPHEN 5; 325 MG/1; MG/1
1 TABLET ORAL EVERY 4 HOURS PRN
Status: DISCONTINUED | OUTPATIENT
Start: 2020-01-30 | End: 2020-01-31 | Stop reason: HOSPADM

## 2020-01-30 RX ORDER — POTASSIUM CHLORIDE 20 MEQ/1
40 TABLET, EXTENDED RELEASE ORAL ONCE
Status: COMPLETED | OUTPATIENT
Start: 2020-01-30 | End: 2020-01-30

## 2020-01-30 RX ORDER — LIDOCAINE 4 G/G
1 PATCH TOPICAL DAILY
Status: DISCONTINUED | OUTPATIENT
Start: 2020-01-30 | End: 2020-01-31 | Stop reason: HOSPADM

## 2020-01-30 RX ORDER — ONDANSETRON 2 MG/ML
4 INJECTION INTRAMUSCULAR; INTRAVENOUS EVERY 6 HOURS PRN
Status: DISCONTINUED | OUTPATIENT
Start: 2020-01-30 | End: 2020-01-31 | Stop reason: HOSPADM

## 2020-01-30 RX ADMIN — METOPROLOL SUCCINATE 25 MG: 25 TABLET, EXTENDED RELEASE ORAL at 10:52

## 2020-01-30 RX ADMIN — SODIUM CHLORIDE: 9 INJECTION, SOLUTION INTRAVENOUS at 03:48

## 2020-01-30 RX ADMIN — DONEPEZIL HYDROCHLORIDE 5 MG: 5 TABLET, FILM COATED ORAL at 20:18

## 2020-01-30 RX ADMIN — ATORVASTATIN CALCIUM 20 MG: 20 TABLET, FILM COATED ORAL at 10:53

## 2020-01-30 RX ADMIN — OXYCODONE HYDROCHLORIDE AND ACETAMINOPHEN 1 TABLET: 5; 325 TABLET ORAL at 21:38

## 2020-01-30 RX ADMIN — POTASSIUM CHLORIDE 40 MEQ: 1500 TABLET, EXTENDED RELEASE ORAL at 10:52

## 2020-01-30 RX ADMIN — ACETAMINOPHEN 650 MG: 325 TABLET ORAL at 03:48

## 2020-01-30 RX ADMIN — ACETAMINOPHEN 650 MG: 325 TABLET ORAL at 14:07

## 2020-01-30 RX ADMIN — CEFTRIAXONE 1 G: 1 INJECTION, POWDER, FOR SOLUTION INTRAMUSCULAR; INTRAVENOUS at 18:14

## 2020-01-30 RX ADMIN — PANTOPRAZOLE SODIUM 40 MG: 40 TABLET, DELAYED RELEASE ORAL at 05:06

## 2020-01-30 RX ADMIN — AMLODIPINE BESYLATE 5 MG: 5 TABLET ORAL at 10:52

## 2020-01-30 RX ADMIN — ENOXAPARIN SODIUM 30 MG: 30 INJECTION SUBCUTANEOUS at 10:51

## 2020-01-30 RX ADMIN — ASPIRIN 81 MG: 81 TABLET, COATED ORAL at 10:52

## 2020-01-30 RX ADMIN — OXYCODONE HYDROCHLORIDE AND ACETAMINOPHEN 1 TABLET: 5; 325 TABLET ORAL at 17:01

## 2020-01-30 RX ADMIN — SODIUM CHLORIDE: 9 INJECTION, SOLUTION INTRAVENOUS at 18:14

## 2020-01-30 ASSESSMENT — PAIN SCALES - GENERAL
PAINLEVEL_OUTOF10: 6
PAINLEVEL_OUTOF10: 9
PAINLEVEL_OUTOF10: 7
PAINLEVEL_OUTOF10: 6
PAINLEVEL_OUTOF10: 6

## 2020-01-30 ASSESSMENT — PAIN DESCRIPTION - LOCATION: LOCATION: BACK;HIP

## 2020-01-30 NOTE — PROGRESS NOTES
Hospitalist Progress Note      Name:  Eric Andersen /Age/Sex: 1928  (80 y.o. female)   MRN & CSN:  [id number] & [id number] Admission Date/Time: 2020  8:14 PM   Location:  [address] [place] [address] PCP: Don Benedict MD         Hospital Day: 3    Assessment and Plan:   Eric Andersen is a 80 y.o.  female  who presents with Fall (on) (from) other stairs and steps, initial encounter    1. Unwitnessed fall: Trauma series negative. Complaining of back pain. For x-ray of the spine today. PT OT recommended skilled nursing placement. 2. Urinary tract infection: Started on Rocephin. Will be discharged on Cipro. 3. 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 Continue Aricept. 4. Coronary artery disease: Continue aspirin and metoprolol. Continue statin. 5. Gastroesophageal reflux disease: Continue PPI. 6. Chronic kidney disease stage III:  7. Leukocytosis: Resolved  8. Elevated transaminases: Ultrasound with possible cirrhosis. Ammonia level normal.  9. Vitamin D deficiency: Continue replacement. 10. Hypertension: Continue amlodipine. Would like to change her CODE STATUS to DNR CCA. Diet DIET CARDIAC;   DVT Prophylaxis [x] Lovenox, []  Heparin, [] SCDs, [] Warfarin  [] NOAC     GI Prophylaxis [x] PPI,  [] H2 Blocker,  [] Carafate,  [] Diet/Tube Feeds   Code Status DNR-CCA   MDM [] Low, [x] Moderate,[]  High     History of Present Illness:     Chief Complaint: Fall (on) (from) other stairs and steps, initial encounter    Seen and examined today. More awake. Complaining of back pain. No recollection of what happened before coming to the hospital.     Ten point ROS reviewed negative, unless as noted above    Objective:        Intake/Output Summary (Last 24 hours) at 2020 1223  Last data filed at 2020 1012  Gross per 24 hour   Intake 1920 ml   Output --   Net 1920 ml      Vitals:   Vitals:    20 1051   BP: (!) 167/84   Pulse: 61   Resp: 15   Temp:    SpO2: 95%     Physical Exam: BUN 21 25*   CREATININE 1.2* 1.3*     Recent Labs     01/28/20 2115 01/30/20  0650   * 57*   ALT 71* 41*   BILITOT 1.3* 1.2*   ALKPHOS 106 67     Recent Labs     01/28/20 2115   INR 1.08     Recent Labs     01/28/20 2115 01/29/20  1009   CKTOTAL 681*  --    TROPONINT 0.010* 0.011*      Imaging reviewed    Electronically signed by Desiree Campoverde MD on 1/30/2020 at 12:23 PM

## 2020-01-30 NOTE — DISCHARGE INSTR - COC
Continuity of Care Form    Patient Name: Elizabeth Law   :  1928  MRN:  [id number]    [address] [place] [address] date:  2020  Discharge date:  2020    Code Status Order: DNR-CCA   Advance Directives:   [facility] Documentation     Date/Time Healthcare Directive Type of Healthcare Directive Copy in [address] Agent's Name Healthcare Agent's Phone Number    20 [phone]  No, patient does not have an advance directive for healthcare treatment -- -- -- -- --          Admitting Physician:  Bethany Grajeda MD  PCP: Nithin Cobb MD    Discharging Nurse: Philippe Bhakta 23 Unit/Room#: 3435/1540-V  Discharging Unit Phone Number: [phone]    Emergency Contact:   Extended Emergency Contact Information  Primary Emergency Contact: Ines Bansal   [address] Phone: [phone]  Relation: Child    Past Surgical History:  Past Surgical History:   Procedure Laterality Date    BREAST LUMPECTOMY      left    HYSTERECTOMY  1965    KNEE SURGERY  2013    left knee       Immunization History: There is no immunization history on file for this patient. Active Problems:  Patient Active Problem List   Diagnosis Code    CAD (coronary artery disease) I25.10    Hypertension I10    Hyperlipidemia E78.5    Family history of coronary artery disease Z80.55    Fall (on) (from) other stairs and steps, initial encounter W10. 8XXA    Severe malnutrition ([address] E43       Isolation/Infection:   Isolation          No Isolation        Patient Infection Status     None to display          Nurse Assessment:  Last Vital Signs: BP (!) 167/84   Pulse 61   Temp 98.5 °F (36.9 °C) (Oral)   Resp 15   Ht 5' (1.524 m)   Wt 131 lb 1.6 oz (59.5 kg)   SpO2 95%   Breastfeeding No   BMI 25.60 kg/m²     Last documented pain score (0-10 scale): Pain Level: 6  Last Weight:   Wt Readings from Last 1 Encounters:   20 131 lb 1.6 oz (59.5 kg)

## 2020-01-30 NOTE — CARE COORDINATION
TSERING left for Agapito Lunsford in Admission at [address] to see if they can take pt. Spoke with with Agapito Lunsford and she is starting precert today.

## 2020-01-31 VITALS
RESPIRATION RATE: 14 BRPM | WEIGHT: 131 LBS | DIASTOLIC BLOOD PRESSURE: 71 MMHG | OXYGEN SATURATION: 98 % | HEART RATE: 79 BPM | TEMPERATURE: 97.7 F | HEIGHT: 60 IN | BODY MASS INDEX: 25.72 KG/M2 | SYSTOLIC BLOOD PRESSURE: 117 MMHG

## 2020-01-31 PROCEDURE — 97530 THERAPEUTIC ACTIVITIES: CPT

## 2020-01-31 PROCEDURE — 6360000002 HC RX W HCPCS: Performed by: NURSE PRACTITIONER

## 2020-01-31 PROCEDURE — 96375 TX/PRO/DX INJ NEW DRUG ADDON: CPT

## 2020-01-31 PROCEDURE — 6370000000 HC RX 637 (ALT 250 FOR IP): Performed by: INTERNAL MEDICINE

## 2020-01-31 PROCEDURE — 6360000002 HC RX W HCPCS: Performed by: HOSPITALIST

## 2020-01-31 PROCEDURE — 6370000000 HC RX 637 (ALT 250 FOR IP): Performed by: HOSPITALIST

## 2020-01-31 PROCEDURE — G0378 HOSPITAL OBSERVATION PER HR: HCPCS

## 2020-01-31 PROCEDURE — 96372 THER/PROPH/DIAG INJ SC/IM: CPT

## 2020-01-31 PROCEDURE — 2580000003 HC RX 258: Performed by: NURSE PRACTITIONER

## 2020-01-31 PROCEDURE — 96367 TX/PROPH/DG ADDL SEQ IV INF: CPT

## 2020-01-31 RX ORDER — LIDOCAINE 4 G/G
1 PATCH TOPICAL DAILY
Qty: 7 PATCH | Refills: 0 | Status: SHIPPED | OUTPATIENT
Start: 2020-02-01

## 2020-01-31 RX ORDER — OXYCODONE HYDROCHLORIDE AND ACETAMINOPHEN 5; 325 MG/1; MG/1
1 TABLET ORAL EVERY 4 HOURS PRN
Qty: 18 TABLET | Refills: 0 | Status: SHIPPED | OUTPATIENT
Start: 2020-01-31 | End: 2020-02-03

## 2020-01-31 RX ORDER — CIPROFLOXACIN 500 MG/1
500 TABLET, FILM COATED ORAL 2 TIMES DAILY
Qty: 14 TABLET | Refills: 0 | Status: SHIPPED | OUTPATIENT
Start: 2020-01-31 | End: 2020-02-07

## 2020-01-31 RX ORDER — GREEN TEA/HOODIA GORDONII 315-12.5MG
1 CAPSULE ORAL DAILY
Qty: 30 TABLET | Refills: 0 | Status: SHIPPED | OUTPATIENT
Start: 2020-01-31 | End: 2020-03-01

## 2020-01-31 RX ORDER — ERGOCALCIFEROL 1.25 MG/1
50000 CAPSULE ORAL WEEKLY
Qty: 4 CAPSULE | Refills: 0 | Status: SHIPPED | OUTPATIENT
Start: 2020-02-05 | End: 2020-03-06

## 2020-01-31 RX ADMIN — METOPROLOL SUCCINATE 25 MG: 25 TABLET, EXTENDED RELEASE ORAL at 11:03

## 2020-01-31 RX ADMIN — OXYCODONE HYDROCHLORIDE AND ACETAMINOPHEN 1 TABLET: 5; 325 TABLET ORAL at 04:06

## 2020-01-31 RX ADMIN — PANTOPRAZOLE SODIUM 40 MG: 40 TABLET, DELAYED RELEASE ORAL at 05:28

## 2020-01-31 RX ADMIN — HYDROMORPHONE HYDROCHLORIDE 0.5 MG: 1 INJECTION, SOLUTION INTRAMUSCULAR; INTRAVENOUS; SUBCUTANEOUS at 00:12

## 2020-01-31 RX ADMIN — ATORVASTATIN CALCIUM 20 MG: 20 TABLET, FILM COATED ORAL at 11:04

## 2020-01-31 RX ADMIN — ENOXAPARIN SODIUM 30 MG: 30 INJECTION SUBCUTANEOUS at 11:09

## 2020-01-31 RX ADMIN — HYDRALAZINE HYDROCHLORIDE 10 MG: 20 INJECTION INTRAMUSCULAR; INTRAVENOUS at 02:38

## 2020-01-31 RX ADMIN — AMLODIPINE BESYLATE 5 MG: 5 TABLET ORAL at 11:04

## 2020-01-31 RX ADMIN — ASPIRIN 81 MG: 81 TABLET, COATED ORAL at 11:03

## 2020-01-31 ASSESSMENT — PAIN SCALES - GENERAL
PAINLEVEL_OUTOF10: 5
PAINLEVEL_OUTOF10: 5

## 2020-01-31 NOTE — PLAN OF CARE
Problem: Pain:  Goal: Pain level will decrease  Description  Pain level will decrease  Outcome: Ongoing  Goal: Control of acute pain  Description  Control of acute pain  Outcome: Ongoing  Goal: Control of chronic pain  Description  Control of chronic pain  Outcome: Ongoing     Problem: Falls - Risk of:  Goal: Will remain free from falls  Description  Will remain free from falls  Outcome: Ongoing  Goal: Absence of physical injury  Description  Absence of physical injury  Outcome: Ongoing     Problem: Nutrition  Goal: Optimal nutrition therapy  Outcome: Ongoing

## 2020-01-31 NOTE — PROGRESS NOTES
especially postural cues for trunk extension, RLE. Standing balance / tolerance: Mod A x2 x 1 minute standing tolerance, x2 trials c seated rest break in between to manage activity tolerance. All therapeutic intervention performed c emphasis on dynamic balance / standing tolerance to inc strength, endurance and act tolerance for inc Indep c ADL tasks, func transfers / mobility. Safety  Patient safely in bedside chair + alarm and telesitter activated at end of session, with call light/phone in reach, and nursing aware. Gait belt was used for func transfers / mobility. Daughter present at bedside. Assessment / Impression:        Patient's tolerance of treatment:  Fair  Adverse Reaction: None  Significant change in status and impact: A&O x4 vs Ox1 self only on evaluation, however, pt continues to appear very confused  Barriers to improvement:  Confusion, decreased strength, decreased balance. Plan for Next Session:    Continue per OT POC.      Time in:  0830  Time out:  0857  Timed treatment minutes:  23  Total treatment time:  23    Electronically signed by:    HANNAH Munoz  1/31/2020, 8:32 AM    Previously filed values:    Goals:  Pt goal: go home  Time Frame for STGs: discharge  Goal 1: Pt will perform UE ADLs  SBA  Goal 2: Pt will perform LE ADLs CGA  Goal 3: Pt will perform toileting CGA  Goal 4: Pt will perform functional transfer w/ AD CGA  Goal 5: Pt will perform functional mobility w/ AD CGA  Goal 6: Pt will perform therex/theract in order to increase functional activity tolerance and dynamic standing balance

## 2020-01-31 NOTE — DISCHARGE SUMMARY
Discharge Summary    Name:  Lucinda Corley /Age/Sex: 1928  (80 y.o. female)   MRN & CSN:  [id number] & [id number] Admission Date/Time: 2020  8:14 PM   Attending:  Gricelda Bella MD Discharging Physician: Gricelda Bella MD     Hospital Course:   Lucinda Corley is a 80 y.o.  female  who presents with Fall (on) (from) other stairs and steps, initial encounter    1. Unwitnessed fall: Trauma series negative. Complaining of back pain. For x-ray of the spine today. PT OT recommended skilled nursing placement. 2. Urinary tract infection: Started on Rocephin. Will be discharged on Cipro. 3. 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 Continue Aricept. 4. Coronary artery disease: Continue aspirin and metoprolol. Continue statin. 5. Gastroesophageal reflux disease: Continue PPI. 6. Chronic kidney disease stage III:  7. Leukocytosis: Resolved  8. Elevated transaminases: Ultrasound with possible cirrhosis. Ammonia level normal.  9. Vitamin D deficiency: Continue replacement. 10. Hypertension: Continue amlodipine. The patient expressed appropriate understanding of and agreement with the discharge recommendations, medications, and plan. Consults this admission:  Imani Urrutia HOSPITALIST    Discharge Instruction:   Follow up appointments:   Primary care physician:  within 2 weeks    Diet:  cardiac diet   Activity: activity as tolerated  Disposition: Discharged to:   [x]Home, [][facility], []SNF, []Acute Rehab, []Hospice  Condition on discharge: Stable    Discharge Medications:      [facility] Medication Instructions DNX:[id number]    Printed on:20 1116   Medication Information                      amLODIPine (NORVASC) 5 MG tablet  Take 5 mg by mouth daily. aspirin 81 MG EC tablet  Take 81 mg by mouth daily. atorvastatin (LIPITOR) 20 MG tablet  Take 20 mg by mouth daily.              ciprofloxacin (CIPRO) 500 MG tablet  Take 1 tablet by mouth 2 times daily for 7 days             donepezil (ARICEPT) 5 MG tablet  Take 5 mg by mouth nightly One time day             Lactobacillus (PROBIOTIC ACIDOPHILUS) TABS  Take 1 tablet by mouth daily             lidocaine 4 % external patch  Place 1 patch onto the skin daily             metoprolol (TOPROL XL) 25 MG XL tablet  Take 25 mg by mouth daily. omeprazole (PRILOSEC) 20 MG capsule  Take 20 mg by mouth daily. oxyCODONE-acetaminophen (PERCOCET) 5-325 MG per tablet  Take 1 tablet by mouth every 4 hours as needed for Pain for up to 3 days. vitamin D (ERGOCALCIFEROL) 1.25 MG (75158 UT) CAPS capsule  Take 1 capsule by mouth once a week                 Objective Findings at Discharge:   /71   Pulse 79   Temp 97.7 °F (36.5 °C) (Oral)   Resp 14   Ht 5' (1.524 m)   Wt 131 lb (59.4 kg)   SpO2 98%   Breastfeeding No   BMI 25.58 kg/m²            GEN    Awake female, sitting upright in bed in no apparent distress. Appears given age. EYES   Pupils are equally round. No scleral erythema, discharge, or conjunctivitis. HENT  Mucous membranes are moist. Oral pharynx without exudates, no evidence of thrush. NECK  Supple, no apparent thyromegaly or masses. RESP  Clear to auscultation, no wheezes, rales or rhonchi. Symmetric chest movement while on room air. CARDIO/VASC           S1/S2 auscultated. Regular rate without appreciable murmurs, rubs, or gallops. No JVD or carotid bruits. Peripheral pulses equal bilaterally and palpable. No peripheral edema. GI        Abdomen is soft without significant tenderness, masses, or guarding. Bowel sounds are normoactive. Rectal exam deferred. MSK    No gross joint deformities. SKIN    Normal coloration, warm, dry. NEURO           Cranial nerves appear grossly intact, normal speech, no lateralizing weakness.   PSYCH            Awake, alert, oriented x2    BMP/CBC  Recent Labs     01/28/20  2115 01/30/20  0650    141   K 3.5 3.0  [place]ED TO [id number] karishma 28 RN @ [phone] [id number] [facility] MLT  RESULTS READ BACK  *   CL 99 103   CO2 27 26   BUN 21 25*   CREATININE 1.2* 1.3*   WBC 12.3* 8.5   HCT 41.6 31.6*   * 115*       IMAGING:  Xr Thoracic Spine (3 Views)    Result Date: 1/31/2020  EXAMINATION: THREE XRAY VIEWS OF THE THORACIC SPINE; THREE XRAY VIEWS OF THE LUMBAR SPINE 1/30/2020 12:50 pm COMPARISON: None HISTORY: ORDERING SYSTEM PROVIDED HISTORY: back pain TECHNOLOGIST PROVIDED HISTORY: Reason for exam:->back pain Reason for Exam: back pain Acuity: Acute Type of Exam: Initial; ORDERING SYSTEM PROVIDED HISTORY: back pain, H/O fall TECHNOLOGIST PROVIDED HISTORY: Reason for exam:->back pain, H/O fall Reason for Exam: back pain Acuity: Acute Type of Exam: Initial FINDINGS: Thoracic spine: The bones are severely demineralized. There is thin bridging ossification throughout the thoracic spine indicative of ankylosis. There are also areas of larger anterior osteophytes. There is a defect within the anterior ossification at what appears to be T10-T11, age indeterminate. A fracture is not identified. A discrete compression deformity is not identified. Lumbar spine: The bones are demineralized. There is a compression deformity what appears to be L2. There is mild compression at L3. There is a suspected compression deformity at L1. There is moderate degenerative disc disease throughout this level. 1. Age indeterminate compression deformities at L1 through L3. Osteopenia significantly limits the examination. 2. Ankylosis throughout the thoracic spine which places the patient at risk for hyperextension injury. A definitive fracture is not identified. 3. MRI of the thoracolumbar spine without contrast is suggested for further evaluation given the degree of osteopenia and findings listed above.      Xr Lumbar Spine (2-3 Views)    Result Date: 1/31/2020  EXAMINATION: THREE XRAY VIEWS OF THE THORACIC SPINE; THREE XRAY VIEWS OF THE LUMBAR SPINE nondisplaced fractures. No gross evidence of acute displaced fractures. The pubic symphysis appears aligned. The sacroiliac joints appears reasonably symmetric. No gross evidence of acute displaced fracture within limitation of severe diffuse osteopenia. RECOMMENDATION: If there is persistent pain, consider follow-up CT or MR evaluation given degree of osteopenia. Ct Head Wo Contrast    Result Date: 1/28/2020  EXAMINATION: CT OF THE HEAD WITHOUT CONTRAST  1/28/2020 9:44 pm TECHNIQUE: CT of the head was performed without the administration of intravenous contrast. Dose modulation, iterative reconstruction, and/or weight based adjustment of the mA/kV was utilized to reduce the radiation dose to as low as reasonably achievable. COMPARISON: None. HISTORY: ORDERING SYSTEM PROVIDED HISTORY: fall TECHNOLOGIST PROVIDED HISTORY: Has a \"code stroke\" or \"stroke alert\" been called? ->No Reason for exam:->fall Reason for Exam: Patient found by family face down at the bottom of her basement stairs, approximately 8 steps. Patient does not remember falling but does complain of leg pain. Acuity: Acute Type of Exam: Initial Mechanism of Injury: fall Relevant Medical/Surgical History: hx cad, htn FINDINGS: BRAIN/VENTRICLES: There is no acute intracranial hemorrhage, mass effect or midline shift. No abnormal extra-axial fluid collection. No evidence of recent territorial infarct. There are nonspecific areas of hypoattenuation in the periventricular white matter and centrum semiovale that are likely related to chronic small vessel ischemic disease. The ventricles and cisternal spaces are prominent consistent with cerebral atrophy. There is no evidence of hydrocephalus. There is intracranial atherosclerosis. ORBITS: The visualized portion of the orbits demonstrate no acute abnormality. SINUSES: The visualized paranasal sinuses and mastoid air cells demonstrate no acute abnormality.   Small right mastoid effusion is felt to be nontraumatic. SOFT TISSUES/SKULL:  There is a right parietal subgaleal hematoma. No underlying calvarial fracture. No acute intracranial abnormality. A right parietal subgaleal hematoma. Small right mastoid effusion. Correlate for signs of infection. Ct Cervical Spine Wo Contrast    Result Date: 1/28/2020  EXAMINATION: CT OF THE CERVICAL SPINE WITHOUT CONTRAST 1/28/2020 9:49 pm TECHNIQUE: CT of the cervical spine was performed without the administration of intravenous contrast. Multiplanar reformatted images are provided for review. Dose modulation, iterative reconstruction, and/or weight based adjustment of the mA/kV was utilized to reduce the radiation dose to as low as reasonably achievable. COMPARISON: None. HISTORY: ORDERING SYSTEM PROVIDED HISTORY: injury TECHNOLOGIST PROVIDED HISTORY: Reason for exam:->injury Reason for Exam: Patient found by family face down at the bottom of her basement stairs, approximately 8 steps. Patient does not remember falling but does complain of leg pain. Acuity: Acute Type of Exam: Initial Mechanism of Injury: fall Relevant Medical/Surgical History: hx dm, htn FINDINGS: BONES/ALIGNMENT: There is no acute fracture or traumatic malalignment. DEGENERATIVE CHANGES: Mild-to-moderate multilevel degenerative changes. There is a posterior disc osteophyte complex at C4-5 with narrowing of the spinal canal to 7 mm at this level. SOFT TISSUES: There is no prevertebral soft tissue swelling. A 1.4 cm right thyroid nodule is of little clinical significance in a patient of this age. No further follow-up is indicated. 1. No acute fracture or malalignment of the cervical spine. 2. Posterior disc osteophyte complex at C4-5 with associated spinal canal narrowing to 7 mm.      Ct Pelvis Wo Contrast Additional Contrast? None    Result Date: 1/28/2020  EXAMINATION: CT OF THE PELVIS WITHOUT CONTRAST 1/28/2020 9:53 pm TECHNIQUE: CT of the pelvis was performed without the administration of intravenous contrast.  Multiplanar reformatted images are provided for review. Dose modulation, iterative reconstruction, and/or weight based adjustment of the mA/kV was utilized to reduce the radiation dose to as low as reasonably achievable. COMPARISON: Pelvis and right hip radiograph 01/28/2020 HISTORY: ORDERING SYSTEM PROVIDED HISTORY: right hip pain TECHNOLOGIST PROVIDED HISTORY: Reason for exam:->right hip pain Additional Contrast?->None Reason for Exam: Patient found by family face down at the bottom of her basement stairs, approximately 8 steps. Patient does not remember falling but does complain of leg pain. Acuity: Acute Type of Exam: Initial Mechanism of Injury: fall Relevant Medical/Surgical History: hx htn, cad, FINDINGS: Lack of intravenous contrast administration, partially limiting evaluation of the soft tissues and vasculature. GI/BOWEL: Normal course and caliber of the included small bowel and colon and of the rectum without obstruction. Partially fecalized contents of some small bowel loops, suggesting a degree of stasis. No definite visualization of the appendix if present. Mild rectal stool and minimal included colonic stool. PELVIS:  Surgically absent uterus. No definite visualization of the ovaries if present. Normal appearance of the urinary bladder. Suspected laxity of the pelvic floor musculature. PERITONEUM/RETROPERITONEUM:  Mild systemic atherosclerotic calcifications. No pelvic lymphadenopathy. No free intraperitoneal fluid nor gas. SOFT TISSUES/BONES:  Tiny fat containing left inguinal hernia. No inguinal lymphadenopathy. Diffuse osseous demineralization. No acute fracture. Joints maintain anatomic alignment. Mild degenerative disc disease and moderate to severe facet arthropathy in the included lumbar spine. Moderate to severe degenerative changes of the sacroiliac joints. Mild degenerative changes of the hips. Severe degenerative changes of the pubic symphysis. Injury: Fall Relevant Medical/Surgical History: None FINDINGS: Severe diffuse osteopenia limits evaluation for nondisplaced fractures. No gross evidence of acute displaced fractures. The pubic symphysis appears aligned. The sacroiliac joints appears reasonably symmetric. No gross evidence of acute displaced fracture within limitation of severe diffuse osteopenia. RECOMMENDATION: If there is persistent pain, consider follow-up CT or MR evaluation given degree of osteopenia.      Electronically signed by Neela Cardenas MD on 1/31/2020 at 11:16 AM

## 2020-01-31 NOTE — CARE COORDINATION
Spoke with Peyton at [facility], pt is approved to come today. Daughter at [address] [facility] desk and agreeable. Set up with [facility] for 1300. Updated pt's nurse, daughter and Jean-Pierre Norris at [address]. PASSR completed and placed in packet.

## 2020-01-31 NOTE — PROGRESS NOTES
Physical Therapy  . Physical Therapy Treatment Note  Name: Lindsey Horn MRN: [id number] :   1928   Date:  2020   Admission Date: 2020 Room:  [address] [place] [address]-A     Restrictions/Precautions:        General precautions, Fall Risk     Communication with other providers:  AMERICA Arita cleared patient for physical therapy. CoTx with Pepper Steven. Subjective:  Patient states:  \"I need to go find my car. Does my daughter know I'm here?:  Pain:   Location, Type, Intensity (0/10 to 10/10): Patient inconsistent with c/o pain. Patient initially reporting pain in back and right hip but then denies pain when asked again. Objective:    Observation:  Patient seated EOB with nurse upon therapist arrival. Patient is confused. External catheter. Tele. MARY. A&O x4    Treatment, including education/measures:  Transfers  Supine to sit :Patient received at EOB  Sit to supine :NT  Scooting :NT  Rolling :NT  Sit to stand :moderate assistance x2  Stand to sit :moderate assistance x2  SPT:moderate assistance x2 wit RW to complete transfer from bed > chair  Patient was able to ambulate 4-5 steps from bed>chair requiring increased time and maximal verbal / tactile instructions for sequencing. Patient presents with increased difficulty progressing R LE despite max verbal / tactile instructions. Standing balance:  Patient tolerated 1 minute of  standing. Patient presents with forward flexed posture at RW and was given verbal / tactile instructions to correct. Patient required moerate assist x2 with no LOB. Patient was limited by fatigue and confusion. Safety  Patient left safely in the chair, with call light/phone in reach with alarm applied. Gait belt was used for transfers and gait.       Assessment / Impression:    Patient's tolerance of treatment:  fair   Adverse Reaction: increased confusion   Significant change in status and impact:  none  Barriers to improvement:  Confusion, decreased endurance, decreased strength   Discharge plan: pending     Plan for Next Session:    Per POC    Time in:  0830  Time out:  0857  Timed treatment minutes:  23  Total treatment time:  23    Previously filed items:     Short term goals  Time Frame for Short term goals: 1 week  Short term goal 1: Patient will perform supine to sit with min A. Short term goal 2: Patient will perform STS with min A with RW  Short term goal 3: Patient will ambulate x10ft with min A and RW.        Electronically signed by:    Nicolas Park PTA [id number]

## 2020-02-03 LAB
EKG ATRIAL RATE: 69 BPM
EKG DIAGNOSIS: NORMAL
EKG P AXIS: 69 DEGREES
EKG P-R INTERVAL: 264 MS
EKG Q-T INTERVAL: 474 MS
EKG QRS DURATION: 172 MS
EKG QTC CALCULATION (BAZETT): 507 MS
EKG R AXIS: -72 DEGREES
EKG T AXIS: 48 DEGREES
EKG VENTRICULAR RATE: 69 BPM